# Patient Record
Sex: FEMALE | Race: NATIVE HAWAIIAN OR OTHER PACIFIC ISLANDER | URBAN - METROPOLITAN AREA
[De-identification: names, ages, dates, MRNs, and addresses within clinical notes are randomized per-mention and may not be internally consistent; named-entity substitution may affect disease eponyms.]

---

## 2023-03-31 ENCOUNTER — OFFICE VISIT (OUTPATIENT)
Facility: LOCATION | Age: 83
End: 2023-03-31
Payer: MEDICARE

## 2023-03-31 DIAGNOSIS — H40.2213 CHRONIC ANGLE-CLOSURE GLAUCOMA, RIGHT EYE, SEVERE STAGE: ICD-10-CM

## 2023-03-31 DIAGNOSIS — H25.89 OTHER AGE-RELATED CATARACT: ICD-10-CM

## 2023-03-31 DIAGNOSIS — H40.2223 CHRONIC ANGLE-CLOSURE GLAUCOMA, LEFT EYE, SEVERE STAGE: Primary | ICD-10-CM

## 2023-03-31 PROCEDURE — 92083 EXTENDED VISUAL FIELD XM: CPT | Performed by: STUDENT IN AN ORGANIZED HEALTH CARE EDUCATION/TRAINING PROGRAM

## 2023-03-31 PROCEDURE — 99214 OFFICE O/P EST MOD 30 MIN: CPT | Performed by: STUDENT IN AN ORGANIZED HEALTH CARE EDUCATION/TRAINING PROGRAM

## 2023-03-31 PROCEDURE — 92133 CPTRZD OPH DX IMG PST SGM ON: CPT | Performed by: STUDENT IN AN ORGANIZED HEALTH CARE EDUCATION/TRAINING PROGRAM

## 2023-03-31 RX ORDER — BRIMONIDINE TARTRATE 2 MG/ML
0.2 % SOLUTION/ DROPS OPHTHALMIC
Qty: 5 | Refills: 0 | Status: ACTIVE
Start: 2023-03-31

## 2023-03-31 RX ORDER — LATANOPROST 50 UG/ML
0.005 % SOLUTION OPHTHALMIC
Qty: 2.5 | Refills: 0 | Status: ACTIVE
Start: 2023-03-31

## 2023-03-31 RX ORDER — DORZOLAMIDE HYDROCHLORIDE AND TIMOLOL MALEATE 20; 5 MG/ML; MG/ML
SOLUTION/ DROPS OPHTHALMIC
Qty: 0 | Refills: 0 | Status: ACTIVE
Start: 2023-03-31

## 2023-03-31 ASSESSMENT — INTRAOCULAR PRESSURE
OD: 52
OS: 16
OS: 18
OD: 46

## 2023-03-31 NOTE — IMPRESSION/PLAN
Impression: Chronic angle-closure glaucoma, right eye, severe stage: O48.7678. Plan: Neovascular glaucoma 
unclear ideology possibly from prio CRVO but unclear due to difficult testing OD Discussed poor prognosis, patient would like to try [address] to lower IOP OD. Patient understand that this would not make her vision better but may prevent from IOP becoming worse. Discussed R/B of CPC in great detail.

## 2023-03-31 NOTE — IMPRESSION/PLAN
Impression: Chronic angle-closure glaucoma, left eye, severe stage: K72.5802. POHx: (-) eye/head trauma FOHx: (-) glaucoma PMHx: Angiogram; hospitalized 2017 and 2018 SocialHx: lives with family, retired, loves to shop Eye medications: Dorzolamide-timolol, Latanoprost, Brimonidine Allergies: None Plan: Testing: OCT/RNFL 12/2022: Poor fixation. unreliable test. OS thin infra-temporally. Baseline OU. OCT/ONH 03/2023: OD unable to preform, OS WNL
OCT/ Retina 12/2022: Poor fixation OD but appears normal. OS full view of contour. HVF 24-2: Pending HVF 10-2: Pending Pachy: Pending Gonio 11/2022:  OS with compression Gonio 12/2022: OD closed. SS inferiorly, PTM superiorly, SS nasally, PTM temporally OS . Fundus Photo: pending Tmax: unknown / pending Target IOP: pending Today:
PI patent, Pressure is stable
---------------------------------------------------------- Continue Dorzolamide/timolol BID OD Continue Latanoprost QHS OD Continue Brimonidine TID OD